# Patient Record
Sex: FEMALE | Race: WHITE
[De-identification: names, ages, dates, MRNs, and addresses within clinical notes are randomized per-mention and may not be internally consistent; named-entity substitution may affect disease eponyms.]

---

## 2019-02-24 ENCOUNTER — HOSPITAL ENCOUNTER (EMERGENCY)
Dept: HOSPITAL 10 - E/R | Age: 34
LOS: 1 days | Discharge: HOME | End: 2019-02-25
Payer: MEDICAID

## 2019-02-24 ENCOUNTER — HOSPITAL ENCOUNTER (EMERGENCY)
Dept: HOSPITAL 91 - E/R | Age: 34
LOS: 1 days | Discharge: HOME | End: 2019-02-25
Payer: MEDICAID

## 2019-02-24 VITALS
WEIGHT: 182.98 LBS | WEIGHT: 182.98 LBS | HEIGHT: 63 IN | BODY MASS INDEX: 32.42 KG/M2 | BODY MASS INDEX: 32.42 KG/M2 | HEIGHT: 63 IN

## 2019-02-24 DIAGNOSIS — S20.411A: ICD-10-CM

## 2019-02-24 DIAGNOSIS — S39.012A: Primary | ICD-10-CM
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
## 2019-02-24 PROCEDURE — 96372 THER/PROPH/DIAG INJ SC/IM: CPT

## 2019-02-24 PROCEDURE — 99284 EMERGENCY DEPT VISIT MOD MDM: CPT

## 2019-02-24 PROCEDURE — 81025 URINE PREGNANCY TEST: CPT

## 2019-02-24 PROCEDURE — 81003 URINALYSIS AUTO W/O SCOPE: CPT

## 2019-02-24 PROCEDURE — 72100 X-RAY EXAM L-S SPINE 2/3 VWS: CPT

## 2019-02-25 VITALS — HEART RATE: 62 BPM | RESPIRATION RATE: 18 BRPM | SYSTOLIC BLOOD PRESSURE: 99 MMHG | DIASTOLIC BLOOD PRESSURE: 45 MMHG

## 2019-02-25 LAB — URINE PH (DIP) POC: 5.5 (ref 5–8.5)

## 2019-02-25 RX ADMIN — BACITRACIN ZINC AND POLYMYXIN B SULFATE 1 APPLIC: 500; 10000 OINTMENT TOPICAL at 03:40

## 2019-02-25 RX ADMIN — CLOSTRIDIUM TETANI TOXOID ANTIGEN (FORMALDEHYDE INACTIVATED), CORYNEBACTERIUM DIPHTHERIAE TOXOID ANTIGEN (FORMALDEHYDE INACTIVATED), BORDETELLA PERTUSSIS TOXOID ANTIGEN (GLUTARALDEHYDE INACTIVATED), BORDETELLA PERTUSSIS FILAMENTOUS HEMAGGLUTININ ANTIGEN (FORMALDEHYDE INACTIVATED), BORDETELLA PERTUSSIS PERTACTIN ANTIGEN, AND BORDETELLA PERTUSSIS FIMBRIAE 2/3 ANTIGEN 1 ML: 5; 2; 2.5; 5; 3; 5 INJECTION, SUSPENSION INTRAMUSCULAR at 03:40

## 2019-02-25 RX ADMIN — KETOROLAC TROMETHAMINE 1 MG: 15 INJECTION, SOLUTION INTRAMUSCULAR; INTRAVENOUS at 01:12

## 2019-02-25 NOTE — ERD
ER Documentation


Chief Complaint


Chief Complaint





BIBA ,from home,fell,c/o R back pain,lac on right back,unable to stand





HPI


This is a 33-year-old female with no significant past medical history who lost 


her balance in her bedroom and fell backwards striking her back against a bench.


 She sustained a superficial excoriation to the right side of her back.  The 


patient endorses tenderness with movement at this time.  The patient does not e


ndorse any other trauma or injury.  Despite pain in her back with movement, she 


does not endorse weakness or numbness or tingling to the face or extremities.  


She does not have any focal deficits.





The patient denies feeling sick recently.  The patient denies fever or chills.  


The patient has had no headache or vision changes.  The patient does not endorse


neck or back pain. The patient denies lightheadedness or dizziness.  The patient


has had no chest pain or trouble breathing.  The patient denies nausea or 


vomiting. The patient denies abdominal pain. The patient denies changes to bowel


movements or urination.





ROS


All systems reviewed and are negative except as per history of present illness.





Allergies


Allergies:  


Coded Allergies:  


     No Known Allergy (Unverified , 2/24/19)





PMhx/Soc


Medical and Surgical Hx:  pt denies Medical Hx, pt denies Surgical Hx


History of Surgery:  No


Hx Neurological Disorder:  No


Hx Respiratory Disorders:  No


Hx Cardiac Disorders:  No


Hx Psychiatric Problems:  No


Hx Miscellaneous Medical Probl:  No


Hx Alcohol Use:  No


Hx Substance Use:  No


Hx Tobacco Use:  No


Smoking Status:  Never smoker





FmHx


Family History:  No diabetes





Physical Exam


Vitals





Vital Signs


  Date      Temp  Pulse  Resp  B/P (MAP)   Pulse Ox  O2          O2 Flow    FiO2


Time                                                 Delivery    Rate


   2/24/19  97.9    100    18      104/59        99


     23:55                           (74)





Physical Exam


Const:   No apparent distress, well-developed, well-nourished


Head:   Normocephalic, Atraumatic 


Eyes:   Normal Conjunctiva.  Extraocular movements intact. Pupils equal, round 


and reactive to light


ENT:   Normal External Ears, Nose and Mouth.


Neck:   Full range of motion. No meningismus.


Resp:   Clear to auscultation bilaterally, No wheezes, rales or rhonchi


Cardio:   Regular rate and rhythm. No murmurs, rubs or gallops


Abd:   Soft, non tender, non distended. Normal bowel sounds


Skin:   No petechiae or rashes


Back:   Mild lumbar midline tenderness with tenderness around her 8 cm excoria


wilson lesion to the right lower back. No CVA tenderness


Ext:   No cyanosis, or edema


Neur:   Awake and alert, oriented 4.  Cranial nerves intact.  No facial droop. 


Normal strength, sensation and coordination.


Psych:   Normal Mood and Affect


Results 24 hrs





Laboratory Tests


                    Test
                      2/25/19
01:10


                    POC Beta HCG, Qualitative  NEGATIVE





Current Medications


 Medications
   Dose
          Sig/Vaughn
       Start Time
   Status  Last


 (Trade)       Ordered        Route
 PRN     Stop Time              Admin
Dose


                              Reason                                Admin


 Ketorolac
     15 mg          ONCE  STAT
    2/25/19       DC           2/25/19


Tromethamine
                 IM
            00:15
                       01:12



 (Toradol)                                   2/25/19 00:17








Procedures/MDM


MDM





The patient's presentation warrants further investigation. Previous medical rec


ords, if available, were reviewed.





IMAGING





Imaging and Radiology interpretation reviewed.





XR lumbar


FINDINGS:


Osseous structures:  Lumbar vertebral body height maintained. No acute endplate 


fracture. No lytic or blastic lesions.


Alignment:  Loss of lordosis. No spondylolisthesis or spondylolysis. Minimal 


lumbar levoscoliosis.


Disc spaces: Maintained.


Visualized sacrum: Intact.


Additional findings:  Aortic calcified plaque absent.


IMPRESSION: Loss of lordosis with minimal lumbar levoscoliosis. This may be 


related to muscle spasm.


Electronically viewed and signed by Physician Amado on 02/25/2019 


02:58 





TREATMENT/DISPOSITION





The patient presents with symptoms most consistent with a muscle strain related 


to a mechanical ground-level fall.  There is no evidence of fracture or 


dislocation.  The patient does not have evidence of cauda equina.  The patient 


was given a dose of Toradol in the emergency department.  Bacitracin was applied


to the wound.  The patient does not know when her last tetanus shot was.  This 


was provided in the emergency department as well.





No emergent diagnoses were identified. At this time, I feel that the patient 


stable for discharge.  The patient was instructed to follow-up with a primary 


care physician in 1-3 days.  The patient will be given strict precautions with 


which to return to the emergency department.





Prescriptions: Ibuprofen, Flexeril





Disclaimer: Inadvertent spelling and grammatical errors are likely due to 


EHR/dictation software use and do not reflect on the overall quality of patient 


care. Note that the electronic time recorded on this note does not necessarily 


reflect the actual time of the patient encounter.





Departure


Diagnosis:  


   Primary Impression:  


   Low back strain


   Encounter type:  initial encounter  Qualified Codes:  S39.012A - Strain of 


   muscle, fascia and tendon of lower back, initial encounter


   Additional Impressions:  


   Fall with no significant injury


   Encounter type:  initial encounter  Qualified Codes:  W19.XXXA - Unspecified 


   fall, initial encounter


   Fall from ground level


   Excoriation of back


   Encounter type:  initial encounter  Laterality:  right  Qualified Codes:  


   S20.411A - Abrasion of right back wall of thorax, initial encounter


Condition:  BONNY Smith MD              Feb 25, 2019 03:25